# Patient Record
Sex: FEMALE | Race: AMERICAN INDIAN OR ALASKA NATIVE
[De-identification: names, ages, dates, MRNs, and addresses within clinical notes are randomized per-mention and may not be internally consistent; named-entity substitution may affect disease eponyms.]

---

## 2021-08-27 ENCOUNTER — HOSPITAL ENCOUNTER (OUTPATIENT)
Dept: HOSPITAL 5 - OR | Age: 66
Discharge: HOME | End: 2021-08-27
Attending: PODIATRIST
Payer: MEDICARE

## 2021-08-27 VITALS — DIASTOLIC BLOOD PRESSURE: 63 MMHG | SYSTOLIC BLOOD PRESSURE: 141 MMHG

## 2021-08-27 DIAGNOSIS — J45.909: ICD-10-CM

## 2021-08-27 DIAGNOSIS — F17.210: ICD-10-CM

## 2021-08-27 DIAGNOSIS — E78.00: ICD-10-CM

## 2021-08-27 DIAGNOSIS — Z98.890: ICD-10-CM

## 2021-08-27 DIAGNOSIS — M20.41: Primary | ICD-10-CM

## 2021-08-27 DIAGNOSIS — Z79.899: ICD-10-CM

## 2021-08-27 DIAGNOSIS — Z91.013: ICD-10-CM

## 2021-08-27 DIAGNOSIS — Z91.041: ICD-10-CM

## 2021-08-27 DIAGNOSIS — I10: ICD-10-CM

## 2021-08-27 PROCEDURE — 28285 REPAIR OF HAMMERTOE: CPT

## 2021-08-27 PROCEDURE — 73620 X-RAY EXAM OF FOOT: CPT

## 2021-08-27 PROCEDURE — C1713 ANCHOR/SCREW BN/BN,TIS/BN: HCPCS

## 2021-08-27 RX ADMIN — HYDROMORPHONE HYDROCHLORIDE PRN MG: 1 INJECTION, SOLUTION INTRAMUSCULAR; INTRAVENOUS; SUBCUTANEOUS at 09:55

## 2021-08-27 RX ADMIN — HYDROMORPHONE HYDROCHLORIDE PRN MG: 1 INJECTION, SOLUTION INTRAMUSCULAR; INTRAVENOUS; SUBCUTANEOUS at 10:55

## 2021-08-27 NOTE — ANESTHESIA DAY OF SURGERY
Anesthesia Day of Surgery





- Day of Surgery


Patient Examined: Yes


Patient H&P Reviewed: Yes


Patient is NPO: Yes


Beta Blockers: No


Peterson's Test: N/A

## 2021-08-27 NOTE — ANESTHESIA CONSULTATION
Anesthesia Consult and Med Hx


Date of service: 08/27/21





- Airway


Anesthetic Teeth Evaluation: Good


ROM Head & Neck: Adequate


Mental/Hyoid Distance: Adequate


Mallampati Class: Class I


Intubation Access Assessment: Probably Good





- Cardiac Exam


Cardiac Exam: RRR





- Pre-Operative Health Status


ASA Pre-Surgery Classification: ASA2


Proposed Anesthetic Plan: General





- Pulmonary


Hx Smoking: Yes (1PPD)


Hx Asthma: Yes (SEASONAL- NO MEDS)


COPD: No


Hx Pneumonia: Yes


Hx Sleep Apnea: No (HERNANDEZ PRE SCREEN LOW RISK)





- Cardiovascular System


Hx Hypertension: Yes (X 10 YRS)


Hx Heart Murmur: Yes (X 35 YRS- CAUSES NO PROBLEMS)





- Central Nervous System


Hx Seizures: Yes (2019- X 1 DURING MRI- ON [facility] IN HOSPITAL, NEVER CONTINUED 

PAST DISCHARGE)


CVA: Yes (5 YRS AGO-RT SIDED WEAKNESS-NEVER TOOK BLOOD THINNERS)


Hx Psychiatric Problems: No





- Gastrointestinal


Hx Ulcer: Yes





- Endocrine


Hx End Stage Renal Disease: No





- Hematic


Hx Anemia: No





- Other Systems


Hx Cancer: Yes

## 2021-08-27 NOTE — XRAY REPORT
RIGHT FOOT 2 VIEWS [id number]



INDICATION: Immediate postop r foot arthroplasty and bunionectomy



COMPARISON: None available.



FINDINGS: Screws are seen in the distal shaft of the first metatarsal. Bony defect at this site presu
mably relates to surgical osteotomy. Mild arthritic changes are seen at the first metatarsophalangeal
 joint. No significant hallux valgus is seen. Bony positioning appears satisfactory. Mild tarsal dege
nerative changes are seen.







Signer Name: Aman Coyne MD 

Signed: 8/27/2021 11:00 AM

Workstation Name: [id number]

## 2021-08-30 NOTE — OPERATIVE REPORT
DATE OF SURGERY: 08/27/2021



PREOPERATIVE DIAGNOSES:

1.  Painful bunion, right foot, severe.

2.  Hammertoe, second digit, right foot.

3.  Hammertoe, third digit, right foot.

4.  Hammertoe, fourth digit, right foot.



POSTOPERATIVE DIAGNOSES:

1.  Painful bunion, right foot, severe.

2.  Hammertoe, second digit, right foot.

3.  Hammertoe, third digit, right foot.

4.  Hammertoe, fourth digit, right foot.



SURGICAL PROCEDURES:

1.  Z-Scarf bunionectomy, right foot.

2.  Arthroplasty, fourth digit, right foot.

3.  Tenotomy second digit, right foot.

4.  Tenotomy third digit, right foot.



ANESTHESIA:  General anesthesia with local anesthesia.



TOURNIQUET: Pneumatic ankle tourniquet, right ankle.



ESTIMATED BLOOD LOSS:  Less than 10 mL.



DESCRIPTION OF PROCEDURE:  At this time, the patient was brought into the 

operating room and placed on the operating table in supine position.  Following 

intravenous sedation and intubation, the patient was given 16 mL of 1:1 mixture 

of 1% lidocaine plain and 0.5% Marcaine plain in affected right foot after 

cleansing with alcohol.  At this time, a well-padded pneumatic ankle tourniquet 

was placed 3 cm proximal to both the medial and lateral malleoli and at this 

time, the foot was then cleansed with non-iodine secondary to the patient having

IODINE ALLERGY and the surgery was done after exsanguinating the foot 225 mmHg 

after using the Esmarch.  At this time, attention was directed to the medial 

aspect of the right first metatarsophalangeal joint with a 10 blade with a deep 

6 cm incision was made at the dorsomedial aspect of the first 

metatarsophalangeal joint, followed by two semielliptical incisions at the 

fourth digit dorsally with a prominent hyperkeratotic lesion noted and two 

semi-converging elliptical incisions was made to remove this lesion and at this 

time with the use of #15 blade, a stab incision was made at both the second and 

third digits with a flexor tenotomy was performed and was flushed with copious 

amount of normal sterile saline and 4-0 Prolene was used to reapproximate both 

the second and third digits ____.  At this time, attention was redirected to the

fourth digit.  There was a prominent proximal phalanx head noted and with the 

use of a sagittal saw was removed just at the surgical neck and flushed 

copiously with normal saline after a football angle was used to remove all bony 

prominences.  At this time, a tenotomy was performed more aggressively and area 

was reapproximated with 4-0 Vicryl followed by 4-0 Prolene simple interrupted 

stitches.  Attention was redirected to the first metatarsophalangeal joint and a

sagittal saw was used to remove 3 mm away of the bone both at the medial 

metatarsal heads, 2 mm at the proximal phalanx and dorsally at the first 

metatarsal head 2 mm width.  The area was contoured with a rotating football 

angle and at this time, a medial approach was used to do an adductor release; 

however, there was noted to be more correction needed.  With the use of the 

sagittal saw, a Z bunionectomy was performed, it should be noted that initial 

use of cannulated screw set was attempted, however, proper screws were not 

available; therefore transitioned into cannulated set ____ was used without 

incident and all redundant bone was removed from the affected area while the 

foot was held in a more corrected position.  The area was flushed copiously with

normal sterile saline.  Capsulorrhaphy was performed with 2-0 Ethibond and 3-0 

Vicryl, 4-0 Vicryl was used deep, followed by subcuticular closure with 4-0 

Prolene.  1 mL of dexamethasone phosphate was infiltrated into the fracture site

followed by Steri-Strips on the medial aspect of the first metatarsophalangeal 

joint.  Area was dressed with Adaptic, Bacitracin, Jose Cruz, Webril and Coban.  The

pneumatic ankle tourniquet was deflated.  Prompt hyperemic response noted to all

affected digits ____ noted the patient did receive 2 grams of Ancef 

prophylactically.  There was noted to be a good hyperemic response after 

deflation of the tourniquet after extubation.  The patient tolerated the 

procedure well and will be transferred to recovery and discharged home with an 

Aircast boot and weightbearing to tolerance.







DD: 08/29/2021 06:42 PM

DT: 08/29/2021 08:44 PM

TID: [id number] RECEIPT: [id number]

RICHARD/CARLITOS/CLARITA